# Patient Record
Sex: MALE | Race: WHITE | NOT HISPANIC OR LATINO | Employment: UNEMPLOYED | URBAN - NONMETROPOLITAN AREA
[De-identification: names, ages, dates, MRNs, and addresses within clinical notes are randomized per-mention and may not be internally consistent; named-entity substitution may affect disease eponyms.]

---

## 2019-04-02 ENCOUNTER — LAB (OUTPATIENT)
Dept: LAB | Facility: HOSPITAL | Age: 54
End: 2019-04-02

## 2019-04-02 ENCOUNTER — OFFICE VISIT (OUTPATIENT)
Dept: SURGERY | Facility: CLINIC | Age: 54
End: 2019-04-02

## 2019-04-02 ENCOUNTER — TRANSCRIBE ORDERS (OUTPATIENT)
Dept: ADMINISTRATIVE | Facility: HOSPITAL | Age: 54
End: 2019-04-02

## 2019-04-02 VITALS
BODY MASS INDEX: 29.61 KG/M2 | HEART RATE: 79 BPM | WEIGHT: 223.4 LBS | DIASTOLIC BLOOD PRESSURE: 88 MMHG | SYSTOLIC BLOOD PRESSURE: 155 MMHG | HEIGHT: 73 IN

## 2019-04-02 DIAGNOSIS — R17 JAUNDICE: ICD-10-CM

## 2019-04-02 DIAGNOSIS — K40.91 UNILATERAL RECURRENT INGUINAL HERNIA WITHOUT OBSTRUCTION OR GANGRENE: ICD-10-CM

## 2019-04-02 DIAGNOSIS — R17 JAUNDICE: Primary | ICD-10-CM

## 2019-04-02 DIAGNOSIS — R17 SCLERAL ICTERUS: ICD-10-CM

## 2019-04-02 DIAGNOSIS — K40.90 NON-RECURRENT UNILATERAL INGUINAL HERNIA WITHOUT OBSTRUCTION OR GANGRENE: ICD-10-CM

## 2019-04-02 DIAGNOSIS — K40.91 UNILATERAL RECURRENT INGUINAL HERNIA WITHOUT OBSTRUCTION OR GANGRENE: Primary | ICD-10-CM

## 2019-04-02 LAB
ALBUMIN SERPL-MCNC: 2.8 G/DL (ref 3.5–5.2)
ALBUMIN/GLOB SERPL: 0.7 G/DL
ALP SERPL-CCNC: 105 U/L (ref 39–117)
ALT SERPL W P-5'-P-CCNC: 70 U/L (ref 1–41)
AMYLASE SERPL-CCNC: 91 U/L (ref 28–100)
ANION GAP SERPL CALCULATED.3IONS-SCNC: 10.9 MMOL/L
ANISOCYTOSIS BLD QL: NORMAL
AST SERPL-CCNC: 111 U/L (ref 1–40)
BASOPHILS # BLD AUTO: 0.04 10*3/MM3 (ref 0–0.2)
BASOPHILS NFR BLD AUTO: 0.6 % (ref 0–1.5)
BILIRUB SERPL-MCNC: 11.6 MG/DL (ref 0.2–1.2)
BUN BLD-MCNC: 9 MG/DL (ref 6–20)
BUN/CREAT SERPL: 10.8 (ref 7–25)
CALCIUM SPEC-SCNC: 8 MG/DL (ref 8.6–10.5)
CHLORIDE SERPL-SCNC: 102 MMOL/L (ref 98–107)
CO2 SERPL-SCNC: 23.1 MMOL/L (ref 22–29)
CREAT BLD-MCNC: 0.83 MG/DL (ref 0.76–1.27)
DEPRECATED RDW RBC AUTO: 75.5 FL (ref 37–54)
EOSINOPHIL # BLD AUTO: 0.12 10*3/MM3 (ref 0–0.4)
EOSINOPHIL NFR BLD AUTO: 1.8 % (ref 0.3–6.2)
ERYTHROCYTE [DISTWIDTH] IN BLOOD BY AUTOMATED COUNT: 23 % (ref 12.3–15.4)
GFR SERPL CREATININE-BSD FRML MDRD: 98 ML/MIN/1.73
GLOBULIN UR ELPH-MCNC: 3.9 GM/DL
GLUCOSE BLD-MCNC: 108 MG/DL (ref 65–99)
HAV IGM SERPL QL IA: REACTIVE
HBV CORE IGM SERPL QL IA: REACTIVE
HBV SURFACE AG SERPL QL IA: ABNORMAL
HCT VFR BLD AUTO: 39.3 % (ref 37.5–51)
HCV AB SER DONR QL: REACTIVE
HGB BLD-MCNC: 13.5 G/DL (ref 13–17.7)
IMM GRANULOCYTES # BLD AUTO: 0.05 10*3/MM3 (ref 0–0.05)
IMM GRANULOCYTES NFR BLD AUTO: 0.8 % (ref 0–0.5)
LIPASE SERPL-CCNC: 96 U/L (ref 13–60)
LYMPHOCYTES # BLD AUTO: 1.89 10*3/MM3 (ref 0.7–3.1)
LYMPHOCYTES NFR BLD AUTO: 29.1 % (ref 19.6–45.3)
MCH RBC QN AUTO: 32.2 PG (ref 26.6–33)
MCHC RBC AUTO-ENTMCNC: 34.4 G/DL (ref 31.5–35.7)
MCV RBC AUTO: 93.8 FL (ref 79–97)
MONOCYTES # BLD AUTO: 0.54 10*3/MM3 (ref 0.1–0.9)
MONOCYTES NFR BLD AUTO: 8.3 % (ref 5–12)
NEUTROPHILS # BLD AUTO: 3.86 10*3/MM3 (ref 1.4–7)
NEUTROPHILS NFR BLD AUTO: 59.4 % (ref 42.7–76)
NRBC BLD AUTO-RTO: 0.2 /100 WBC (ref 0–0)
PLAT MORPH BLD: NORMAL
PLATELET # BLD AUTO: 8 10*3/MM3 (ref 140–450)
POTASSIUM BLD-SCNC: 3.5 MMOL/L (ref 3.5–5.2)
PROT SERPL-MCNC: 6.7 G/DL (ref 6–8.5)
RBC # BLD AUTO: 4.19 10*6/MM3 (ref 4.14–5.8)
SODIUM BLD-SCNC: 136 MMOL/L (ref 136–145)
TARGETS BLD QL SMEAR: NORMAL
TSH SERPL DL<=0.05 MIU/L-ACNC: 5.02 MIU/ML (ref 0.27–4.2)
WBC MORPH BLD: NORMAL
WBC NRBC COR # BLD: 6.5 10*3/MM3 (ref 3.4–10.8)

## 2019-04-02 PROCEDURE — 80050 GENERAL HEALTH PANEL: CPT

## 2019-04-02 PROCEDURE — 99244 OFF/OP CNSLTJ NEW/EST MOD 40: CPT | Performed by: SURGERY

## 2019-04-02 PROCEDURE — 85007 BL SMEAR W/DIFF WBC COUNT: CPT

## 2019-04-02 PROCEDURE — 80074 ACUTE HEPATITIS PANEL: CPT

## 2019-04-02 PROCEDURE — 36415 COLL VENOUS BLD VENIPUNCTURE: CPT

## 2019-04-02 PROCEDURE — 83690 ASSAY OF LIPASE: CPT

## 2019-04-02 PROCEDURE — 82150 ASSAY OF AMYLASE: CPT

## 2019-04-02 RX ORDER — ASPIRIN 81 MG/1
81 TABLET ORAL DAILY
COMMUNITY

## 2019-04-02 NOTE — PROGRESS NOTES
Subjective   Jacobo Jeff is a 53 y.o. male is being seen for consultation today at the request of Larry Uribe APRN    Jacobo Jeff is a 53 y.o. male With very large right inguinal hernia containing colon and omentum.  The contents reduced and there is a element of hydrocele.  No definitive left-sided hernia but he may have a hernia defect with no evidence of contents as they are all through the right inguinal space.  He has no voiding difficulty.  Unfortunately he has developed scleral icterus and jaundice and he states he has dated someone who later told him that hepatitis C.  No obstructive symptoms reported.        History reviewed. No pertinent past medical history.    Family History   Problem Relation Age of Onset   • Cancer Paternal Uncle    • Hypertension Paternal Grandfather    • Diabetes Other    • Heart disease Other    • Cancer Paternal Uncle        Social History     Socioeconomic History   • Marital status: Unknown     Spouse name: Not on file   • Number of children: Not on file   • Years of education: Not on file   • Highest education level: Not on file   Tobacco Use   • Smoking status: Former Smoker   • Smokeless tobacco: Never Used   Substance and Sexual Activity   • Alcohol use: Yes     Frequency: Never     Comment: OCC   • Drug use: No   • Sexual activity: No       Past Surgical History:   Procedure Laterality Date   • CYST REMOVAL     • LAPAROSCOPIC CHOLECYSTECTOMY         Review of Systems   Constitutional: Negative for activity change, appetite change, chills and fever.   HENT: Negative for sore throat and trouble swallowing.    Eyes: Negative for visual disturbance.   Respiratory: Negative for cough and shortness of breath.    Cardiovascular: Negative for chest pain and palpitations.   Gastrointestinal: Positive for abdominal pain. Negative for abdominal distention, blood in stool, constipation, diarrhea, nausea and vomiting.   Endocrine: Negative for cold intolerance and heat  "intolerance.   Genitourinary: Negative for dysuria.   Musculoskeletal: Negative for joint swelling.   Skin: Negative for color change, rash and wound.   Allergic/Immunologic: Negative for immunocompromised state.   Neurological: Negative for dizziness, seizures, weakness and headaches.   Hematological: Negative for adenopathy. Does not bruise/bleed easily.   Psychiatric/Behavioral: Negative for agitation and confusion.         /88   Pulse 79   Ht 185.4 cm (73\")   Wt 101 kg (223 lb 6.4 oz)   BMI 29.47 kg/m²   Objective   Physical Exam   Constitutional: He is oriented to person, place, and time. He appears well-developed.   HENT:   Head: Normocephalic and atraumatic.   Mouth/Throat: Mucous membranes are normal.   Eyes: Conjunctivae are normal. Pupils are equal, round, and reactive to light.   Neck: Neck supple. No JVD present. No tracheal deviation present. No thyromegaly present.   Cardiovascular: Normal rate and regular rhythm. Exam reveals no gallop and no friction rub.   No murmur heard.  Pulmonary/Chest: Effort normal and breath sounds normal.   Abdominal: Soft. He exhibits no distension. There is no splenomegaly or hepatomegaly. There is no tenderness. A hernia is present. Hernia confirmed positive in the right inguinal area.   Colon and omental containing right inguinal hernia with hydrocele.   Musculoskeletal: Normal range of motion. He exhibits no deformity.   Neurological: He is alert and oriented to person, place, and time.   Skin: Skin is warm and dry.   Psychiatric: He has a normal mood and affect.             Assessment   Jacobo was seen today for bilateral inguinal hernia.    Diagnoses and all orders for this visit:    Jaundice  -     Hepatitis Panel, Acute; Future  -     Comprehensive Metabolic Panel; Future  -     CBC (No Diff); Future    Non-recurrent unilateral inguinal hernia without obstruction or gangrene    Scleral icterus      Jacobo Jeff is a 53 y.o. male with very large right " inguinal hernia.  He has scleral icterus and jaundice concerning for possible underlying liver disease including hepatitis.  He will be evaluated hepatitis panel, CBC, and CMP.  He will follow-up after labs to discuss surgical repair and less he requires management of underlying liver condition prior to surgery to optimize his risk.  He is aware of symptoms that should prompt return to care.    Patient's Body mass index is 29.47 kg/m². BMI is above normal parameters. Recommendations include: educational material.

## 2019-04-10 ENCOUNTER — OFFICE VISIT (OUTPATIENT)
Dept: SURGERY | Facility: CLINIC | Age: 54
End: 2019-04-10

## 2019-04-10 VITALS — BODY MASS INDEX: 29.55 KG/M2 | WEIGHT: 223 LBS | HEIGHT: 73 IN

## 2019-04-10 DIAGNOSIS — K40.90 NON-RECURRENT UNILATERAL INGUINAL HERNIA WITHOUT OBSTRUCTION OR GANGRENE: Primary | ICD-10-CM

## 2019-04-10 PROCEDURE — 99213 OFFICE O/P EST LOW 20 MIN: CPT | Performed by: SURGERY

## 2019-04-12 NOTE — PROGRESS NOTES
Subjective   Jacobo Jeff is a 53 y.o. male  is here today for follow-up.         Jacobo Jeff is a 53 y.o. male here for follow up for large right inguinal hernia.  The patient at the time of evaluation was noted to have scleral icterus and jaundice and was found to have hepatitis AB and C.  He has elements of liver dysfunction and is being treated as an outpatient for his hepatitis C.  The patient has ascites which is increasing the size of the defect of the right hernia.  No need for surgery at this time.  No obstructive symptoms reported.    Physical Exam  Jaundice and scleral icterus  Large right inguinal hernia with fluid around the hernia in the right hemiscrotum.  No obstructive symptoms.  Abdomen is soft nontender nondistended            Assessment     Jacobo was seen today for jaundice.    Diagnoses and all orders for this visit:    Non-recurrent unilateral inguinal hernia without obstruction or gangrene      Jacobo Jeff is a 53 y.o. male with large right inguinal hernia but currently dealing with acute hepatitis with likely cirrhotic change due to ascites and active hepatitis A, B, and C.  Follow-up after treatment for hepatitis and if hepatitis has resolved and liver function improves will consider elective repair of this hernia.

## 2019-11-21 LAB — REF LAB TEST METHOD: NORMAL
